# Patient Record
Sex: FEMALE | Race: WHITE | HISPANIC OR LATINO | Employment: FULL TIME | URBAN - METROPOLITAN AREA
[De-identification: names, ages, dates, MRNs, and addresses within clinical notes are randomized per-mention and may not be internally consistent; named-entity substitution may affect disease eponyms.]

---

## 2017-03-09 ENCOUNTER — HOSPITAL ENCOUNTER (EMERGENCY)
Facility: HOSPITAL | Age: 22
Discharge: HOME | End: 2017-03-09
Attending: EMERGENCY MEDICINE

## 2017-03-09 VITALS
HEIGHT: 67 IN | TEMPERATURE: 99 F | BODY MASS INDEX: 23.54 KG/M2 | OXYGEN SATURATION: 99 % | DIASTOLIC BLOOD PRESSURE: 66 MMHG | WEIGHT: 150 LBS | RESPIRATION RATE: 18 BRPM | HEART RATE: 63 BPM | SYSTOLIC BLOOD PRESSURE: 120 MMHG

## 2017-03-09 DIAGNOSIS — N39.0 URINARY TRACT INFECTION WITHOUT HEMATURIA, SITE UNSPECIFIED: Primary | ICD-10-CM

## 2017-03-09 LAB
B-HCG UR QL: NEGATIVE
BILIRUB UR QL STRIP: NEGATIVE
CLARITY UR: CLEAR
COLOR UR: YELLOW
CTP QC/QA: YES
GLUCOSE UR QL STRIP: NEGATIVE
HGB UR QL STRIP: NEGATIVE
KETONES UR QL STRIP: NEGATIVE
LEUKOCYTE ESTERASE UR QL STRIP: ABNORMAL
MICROSCOPIC COMMENT: NORMAL
NITRITE UR QL STRIP: NEGATIVE
PH UR STRIP: 7 [PH] (ref 5–8)
PROT UR QL STRIP: NEGATIVE
SP GR UR STRIP: 1.01 (ref 1–1.03)
URN SPEC COLLECT METH UR: ABNORMAL
UROBILINOGEN UR STRIP-ACNC: NEGATIVE EU/DL
WBC #/AREA URNS HPF: 5 /HPF (ref 0–5)

## 2017-03-09 PROCEDURE — 99283 EMERGENCY DEPT VISIT LOW MDM: CPT | Mod: 25

## 2017-03-09 PROCEDURE — 81000 URINALYSIS NONAUTO W/SCOPE: CPT

## 2017-03-09 PROCEDURE — 25000003 PHARM REV CODE 250: Performed by: NURSE PRACTITIONER

## 2017-03-09 PROCEDURE — 87086 URINE CULTURE/COLONY COUNT: CPT

## 2017-03-09 PROCEDURE — 81025 URINE PREGNANCY TEST: CPT | Performed by: NURSE PRACTITIONER

## 2017-03-09 RX ORDER — PHENAZOPYRIDINE HYDROCHLORIDE 200 MG/1
200 TABLET, FILM COATED ORAL 3 TIMES DAILY
Qty: 6 TABLET | Refills: 0 | Status: SHIPPED | OUTPATIENT
Start: 2017-03-09 | End: 2017-03-19

## 2017-03-09 RX ORDER — NITROFURANTOIN 25; 75 MG/1; MG/1
100 CAPSULE ORAL 2 TIMES DAILY
Qty: 10 CAPSULE | Refills: 0 | Status: SHIPPED | OUTPATIENT
Start: 2017-03-09 | End: 2017-03-16

## 2017-03-09 RX ORDER — PHENAZOPYRIDINE HYDROCHLORIDE 100 MG/1
200 TABLET, FILM COATED ORAL
Status: COMPLETED | OUTPATIENT
Start: 2017-03-09 | End: 2017-03-09

## 2017-03-09 RX ADMIN — PHENAZOPYRIDINE HYDROCHLORIDE 200 MG: 100 TABLET ORAL at 04:03

## 2017-03-09 NOTE — DISCHARGE INSTRUCTIONS
Infección De La Vejiga,Flora [Bladder Infection: Female, Adult]    Jo infección de la vejiga (cistitis [cystitis - UTI]) suele provocar constantes deseos de orinar y ardor al orinar. Es posible que la orina se steff turbia u oscura, o que tenga olor speedy. Puede elham dolor en la parte baja del abdomen. Jo infección de la vejiga se produce cuando las bacterias del área vaginal ingresan al orificio donde desemboca la vejiga (la uretra [urethra]). Puede ocurrir después de elham tenido relaciones sexuales, por usar ropas muy ajustadas, por deshidratación y otros factores.  Cuidados En La [place]  · Oxana abundante líquido (al menos, entre 6 y 8 vasos por día, excepto que le hayan indicado limitar los líquidos por otras razones médicas). Eso hará que el medicamento ingrese mejor al sistema urinario y arrastrará las bacterias fuera de virk cuerpo.  · Evite tener relaciones sexuales hasta que los síntomas hayan desaparecido.  · No consuma cafeína, alcohol ni comidas muy condimentadas, ya que pueden irritar la vejiga.  · Jo infección de la vejiga (bladder infection) se trata con antibióticos (antibiotics). También es posible que le receten Pyridum (nombre genérico: fenazopiridina [phenazopyridine]) para aliviar el ardor. Malu medicamento hará que virk orina sea de color naranja brillante. Es posible que roshan orina de color naranja le manche la ropa. Puede usar un protector diario o jo toalla femenina para proteger la ropa.  Evite Futuras Infecciones  · Después de evacuar el intestino, siempre límpiese con un movimiento de adelante hacia atrás.  · Mantenga la serena genital limpia y seca.  · Oxana mucho líquidos todos los días para evitar la deshidratación (dehydration).  · Ambos integrantes de la keon deben lavarse antes del acto sexual.  · Orine juan después del acto sexual para limpiar la vejiga.  · Use ropa interior de algodón y pantimedias con recubrimiento de algodón. Evite usar pantalones muy ajustados.  · Si está  tomando píldoras anticonceptivas y tiene frecuentes infecciones de vejiga, háblelo con virk médico.  Visita De Control  Visite a virk médico si no mcleod desparecido todos los síntomas después de jaimee días de tratamiento.  Busque Prontamente Atención Médica  si algo de lo siguiente ocurre:  · Fiebre de 100.4°F (38°C) o más dee, o maddy le haya indicado virk proveedor de atención médica.  · No hay mejoría después de jaimee días de tratamiento.  · Mayor dolor en la espalda o el abdomen.  · Vómito persistente (no puede mantener el medicamento en el estómago).  · Debilidad, mareo o desmayo.  · Secreción vaginal.  · Dolor, enrojecimiento o hinchazón en los labios vaginales (serena exterior de la vagina).  Date Last Reviewed: 4/8/2014  © 2335-8589 [employer]. [address] [place] [address], [place] [address]. Todos los derechos reservados. Esta información no pretende sustituir la atención médica profesional. Sólo virk médico puede diagnosticar y tratar un problema de kailee.

## 2017-03-09 NOTE — ED AVS SNAPSHOT
[facility]  [address] [facility] [address]               Katherine SHELBY Andrews   3/9/2017  3:43 PM   ED    Descripción:  Female : 1995   Departamento:  [facility]           Tsai Cuidado fue coordinado por:     Provider Role From To    Marlee Woodruff MD Attending Provider 17 [id number] --    ANNEMARIE Garg Nurse Practitioner 17 [id number] --      Razón de la khushbu     Dysuria           Diagnósticos de Esta Visita        Comentarios    Urinary tract infection without hematuria, site unspecified    -  Primario       ED Disposition     Ninguna           Lista de tareas           Información de seguimiento     Realice un seguimiento con:  [facility]    Cómo:  Amanuel jo khushbu lo antes posible    Cuándo:  3/12/2017    Especialidad:  Family Medicine    Información de contacto:    [address]  [facility] [address] [phone]        Seguimiento:       Cómo:  Amanuel jo khushbu lo antes posible    Cuándo:  3/12/2017      Recetas para recoger        Disp Refills Start End    nitrofurantoin, macrocrystal-monohydrate, (MACROBID) 100 MG capsule 10 capsule 0 3/9/2017 3/16/2017    Take 1 capsule (100 mg total) by mouth 2 (two) times daily. - Oral    phenazopyridine (PYRIDIUM) 200 MG tablet 6 tablet 0 3/9/2017 3/19/2017    Take 1 tablet (200 mg total) by mouth 3 (three) times daily. PRN pain. - Oral      [facility]     [facility] Enfermeras - Asistencia   Enfermeras registradas de [facility] pueden ayudarle a reservar jo khushbu, proveer educación para la kailee, asesoría clínica, y otros servicios de asesoramiento.   Llame para gustavo servicio gratuito a [phone].             Medicamentos           Mensaje sobre Medicamentos     Verificar los cambios y / o adiciones a tsai régimen de medicación son los mismos que discutir con tsai médico. Si cualquiera de estos cambios o adiciones son  "incorrectos, por favor notifique a virk proveedor de atención médica.        EMPEZAR a bravo estos medicamentos NUEVOS        Refills    nitrofurantoin, macrocrystal-monohydrate, (MACROBID) 100 MG capsule 0    Sig: Take 1 capsule (100 mg total) by mouth 2 (two) times daily.    Categoría: Print    Vía: Oral    phenazopyridine (PYRIDIUM) 200 MG tablet 0    Sig: Take 1 tablet (200 mg total) by mouth 3 (three) times daily. PRN pain.    Categoría: Print    Vía: Oral      These medications were administered today        Dose Freq    phenazopyridine tablet 200 mg 200 mg ED 1 Time    Sig: Take 2 tablets (200 mg total) by mouth ED 1 Time.    Categoría: Normal    Vía: Oral           Verifique que la siguiente lista de medicamentos es jo representación exacta de los medicamentos que está tomando actualmente. Si no hay ningunos reportados, la lista puede estar en govea. Si no es correcta, por favor póngase en contacto con virk proveedor de atención médica. Lleve esta lista con usted en nasir de emergencia.           Medicamentos Actuales     nitrofurantoin, macrocrystal-monohydrate, (MACROBID) 100 MG capsule Take 1 capsule (100 mg total) by mouth 2 (two) times daily.    phenazopyridine (PYRIDIUM) 200 MG tablet Take 1 tablet (200 mg total) by mouth 3 (three) times daily. PRN pain.           Información de referencia clínica           Mariela signos vitales freedom     PS Pulso Temperatura Resp [place]    129/75 (BP Location: Right arm, Patient Position: Sitting) 63 98.2 °F (36.8 °C) (Oral) 14 5' 7" (1.702 m) 68 kg (150 lb)    Ultima menstruación SpO2 BMI ([facility])             02/16/2017 100% 23.49 kg/m2         Alergias     A partir del:  3/9/2017        No Known Allergies      Vacunas     Administradas en la fecha de la visita:  3/9/2017        None      ED Micro, Lab, POCT     Start Ordered       Status Ordering Provider    03/09/17 1642 03/09/17 1642  Urine culture  Add-on      Completed     03/09/17 1550 03/09/17 1549  Urinalysis  " STAT      Final result     03/09/17 1550 03/09/17 1549  POCT urine pregnancy  Once      Final result     03/09/17 1549 03/09/17 1549  Urinalysis Microscopic  Once      Final result       ED Imaging Orders     None        Instrucciones a andrez de dee         Infección De La Vejiga,Flora [Bladder Infection: Female, Adult]    Jo infección de la vejiga (cistitis [cystitis - UTI]) suele provocar constantes deseos de orinar y ardor al orinar. Es posible que la orina se steff turbia u oscura, o que tenga olor spedey. Puede elham dolor en la parte baja del abdomen. Jo infección de la vejiga se produce cuando las bacterias del área vaginal ingresan al orificio donde desemboca la vejiga (la uretra [urethra]). Puede ocurrir después de elham tenido relaciones sexuales, por usar ropas muy ajustadas, por deshidratación y otros factores.  Cuidados En [place]  · Oxana abundante líquido (al menos, entre 6 y 8 vasos por día, excepto que le hayan indicado limitar los líquidos por otras razones médicas). Eso hará que el medicamento ingrese mejor al sistema urinario y arrastrará las bacterias fuera de virk cuerpo.  · Evite tener relaciones sexuales hasta que los síntomas hayan desaparecido.  · No consuma cafeína, alcohol ni comidas muy condimentadas, ya que pueden irritar la vejiga.  · Jo infección de la vejiga (bladder infection) se trata con antibióticos (antibiotics). También es posible que le receten Pyridum (nombre genérico: fenazopiridina [phenazopyridine]) para aliviar el ardor. Malu medicamento hará que virk orina sea de color naranja brillante. Es posible que roshan orina de color naranja le manche la ropa. Puede usar un protector diario o jo toalla femenina para proteger la ropa.  Evite Futuras Infecciones  · Después de evacuar el intestino, siempre límpiese con un movimiento de adelante hacia atrás.  · Mantenga la serena genital limpia y seca.  · Oxana mucho líquidos todos los días para evitar la deshidratación (dehydration).  · Ambos  integrantes de la keon deben lavarse antes del acto sexual.  · Orine juan después del acto sexual para limpiar la vejiga.  · Use ropa interior de algodón y pantimedias con recubrimiento de algodón. Evite usar pantalones muy ajustados.  · Si está tomando píldoras anticonceptivas y tiene frecuentes infecciones de vejiga, háblelo con virk médico.  Visita De Control  Visite a virk médico si no mcleod desparecido todos los síntomas después de jaimee días de tratamiento.  Busque Prontamente Atención Médica  si algo de lo siguiente ocurre:  · Fiebre de 100.4°F (38°C) o más dee, o maddy le haya indicado virk proveedor de atención médica.  · No hay mejoría después de jaimee días de tratamiento.  · Mayor dolor en la espalda o el abdomen.  · Vómito persistente (no puede mantener el medicamento en el estómago).  · Debilidad, mareo o desmayo.  · Secreción vaginal.  · Dolor, enrojecimiento o hinchazón en los labios vaginales (serena exterior de la vagina).  Date Last Reviewed: 4/8/2014  © 7034-7380 [employer]. [address] [place] [address], [place] [address]. Todos los derechos reservados. Esta información no pretende sustituir la atención médica profesional. Sólo virk médico puede diagnosticar y tratar un problema de kailee.          Referencias/Adjuntos de dee     PHENAZOPYRIDINE TABLETS (Albanian)    NITROFURANTOIN TABLETS OR CAPSULES (Albanian)      Registrarse para [facility]     La activación de virk cuenta [facility] es tan fácil maddy 1-2-3!    1) Ir a [email or web address], seleccione Registrarse Ahora, meter el código de activación y virk fecha de nacimiento, y seleccione Próximo.    [id number]  Expires: 4/23/2017  4:43 PM      2) Crear un nombre de usuario y contraseña para usar cuando se visita [facility] en el futuro y selecciona jo pregunta de seguridad en nasir de que pierda virk contraseña y seleccione Próximo.    3) Introduzca virk dirección de correo electrónico y juan [facility] en Registrarse!    Información Adicional  Si tiene alguna  pregunta, por favor, e-mail [email or web address] o llame al [phone] para hablar con nuestro personal. Recuerde, [facility] no debe ser usada para necesidades urgentes. En nasir de emergencia médica, llame al [phone].         [facility] cumple con las leyes federales aplicables de derechos civiles y no discrimina por motivos de elvin, color, origen nacional, edad, discapacidad, o sexo.        Language Assistance Services     ATTENTION: Language assistance services are available, free of charge. Please call [phone].      ATENCIÓN: Si habla anandañol, tiene a virk disposición servicios gratuitos de asistencia lingüística. Llame al [phone].     CHÚ Ý: N?u b?n nói Ti?ng Vi?t, có các d?ch v? h? tr? ngôn ng? mi?n phí dành cho b?n. G?i s? [phone].                      [facility]  [address]  [facility] [address]               Katherine Andrews   3/9/2017  3:43 PM   ED    Description:  Female : 1995   Department:  [facility]           Your Care was Coordinated By:     Provider Role From To    Marlee Woodruff MD Attending Provider 17 2697 --    ANNEMARIE Garg Nurse Practitioner 17 1541 --      Reason for Visit     Dysuria           Diagnoses this Visit        Comments    Urinary tract infection without hematuria, site unspecified    -  Primary       ED Disposition     None           To Do List           Follow-up Information     Follow up with [facility]. Schedule an appointment as soon as possible for a visit in 3 days.    Specialty:  Family Medicine    Contact information:    [address], [address]  [facility] [address] [phone]        Schedule an appointment as soon as possible for a visit in 3 days to follow up.       These Medications        Disp Refills Start End    nitrofurantoin, macrocrystal-monohydrate, (MACROBID) 100 MG capsule 10 capsule 0 3/9/2017  3/16/2017    Take 1 capsule (100 mg total) by mouth 2 (two) times daily. - Oral    phenazopyridine (PYRIDIUM) 200 MG tablet 6 tablet 0 3/9/2017 3/19/2017    Take 1 tablet (200 mg total) by mouth 3 (three) times daily. PRN pain. - Oral      [facility]     [facility] On Call Nurse Care Line - 24/7 Assistance  Registered nurses in the [facility] On Call Center provide clinical advisement, health education, appointment booking, and other advisory services.  Call for this free service at [phone].             Medications           Message regarding Medications     Verify the changes and/or additions to your medication regime listed below are the same as discussed with your clinician today.  If any of these changes or additions are incorrect, please notify your healthcare provider.        START taking these NEW medications        Refills    nitrofurantoin, macrocrystal-monohydrate, (MACROBID) 100 MG capsule 0    Sig: Take 1 capsule (100 mg total) by mouth 2 (two) times daily.    Class: Print    Route: Oral    phenazopyridine (PYRIDIUM) 200 MG tablet 0    Sig: Take 1 tablet (200 mg total) by mouth 3 (three) times daily. PRN pain.    Class: Print    Route: Oral      These medications were administered today        Dose Freq    phenazopyridine tablet 200 mg 200 mg ED 1 Time    Sig: Take 2 tablets (200 mg total) by mouth ED 1 Time.    Class: Normal    Route: Oral           Verify that the below list of medications is an accurate representation of the medications you are currently taking.  If none reported, the list may be blank. If incorrect, please contact your healthcare provider. Carry this list with you in case of emergency.           Current Medications     nitrofurantoin, macrocrystal-monohydrate, (MACROBID) 100 MG capsule Take 1 capsule (100 mg total) by mouth 2 (two) times daily.    phenazopyridine (PYRIDIUM) 200 MG tablet Take 1 tablet (200 mg total) by mouth 3 (three) times daily. PRN pain.           Clinical  "Reference Information           Your Vitals Were     BP Pulse Temp Resp Height Weight    129/75 (BP Location: Right arm, Patient Position: Sitting) 63 98.2 °F (36.8 °C) (Oral) 14 5' 7" (1.702 m) 68 kg (150 lb)    Last Period SpO2 BMI             02/16/2017 100% 23.49 kg/m2         Allergies as of 3/9/2017     No Known Allergies      Immunizations Administered on Date of Encounter - 3/9/2017     None      ED Micro, Lab, POCT     Start Ordered       Status Ordering Provider    03/09/17 1642 03/09/17 1642  Urine culture  Add-on      Completed     03/09/17 1550 03/09/17 1549  Urinalysis  STAT      Final result     03/09/17 1550 03/09/17 1549  POCT urine pregnancy  Once      Final result     03/09/17 1549 03/09/17 1549  Urinalysis Microscopic  Once      Final result       ED Imaging Orders     None        Discharge Instructions         Infección De La Vejiga,Flora [Bladder Infection: Female, Adult]    Suze infección de la vejiga (cistitis [cystitis - UTI]) suele provocar constantes deseos de orinar y ardor al orinar. Es posible que la orina se steff turbia u oscura, o que tenga olor speedy. Puede elham dolor en la parte baja del abdomen. Suze infección de la vejiga se produce cuando las bacterias del área vaginal ingresan al orificio donde desemboca la vejiga (la uretra [urethra]). Puede ocurrir después de elham tenido relaciones sexuales, por usar ropas muy ajustadas, por deshidratación y otros factores.  Cuidados En La [place]  · Oxana abundante líquido (al menos, entre 6 y 8 vasos por día, excepto que le hayan indicado limitar los líquidos por otras razones médicas). Eso hará que el medicamento ingrese mejor al sistema urinario y arrastrará las bacterias fuera de virk cuerpo.  · Evite tener relaciones sexuales hasta que los síntomas hayan desaparecido.  · No consuma cafeína, alcohol ni comidas muy condimentadas, ya que pueden irritar la vejiga.  · Suze infección de la vejiga (bladder infection) se trata con antibióticos " (antibiotics). También es posible que le receten Pyridum (nombre genérico: fenazopiridina [phenazopyridine]) para aliviar el ardor. Malu medicamento hará que virk orina sea de color naranja brillante. Es posible que roshan orina de color naranja le manche la ropa. Puede usar un protector diario o jo toalla femenina para proteger la ropa.  Evite Futuras Infecciones  · Después de evacuar el intestino, siempre límpiese con un movimiento de adelante hacia atrás.  · Mantenga la serena genital limpia y seca.  · Oxana mucho líquidos todos los días para evitar la deshidratación (dehydration).  · Ambos integrantes de la keon deben lavarse antes del acto sexual.  · Orine juan después del acto sexual para limpiar la vejiga.  · Use ropa interior de algodón y pantimedias con recubrimiento de algodón. Evite usar pantalones muy ajustados.  · Si está tomando píldoras anticonceptivas y tiene frecuentes infecciones de vejiga, háblelo con virk médico.  Visita De Control  Visite a virk médico si no mcleod desparecido todos los síntomas después de jaimee días de tratamiento.  Busque Prontamente Atención Médica  si algo de lo siguiente ocurre:  · Fiebre de 100.4°F (38°C) o más dee, o maddy le haya indicado virk proveedor de atención médica.  · No hay mejoría después de jaimee días de tratamiento.  · Mayor dolor en la espalda o el abdomen.  · Vómito persistente (no puede mantener el medicamento en el estómago).  · Debilidad, mareo o desmayo.  · Secreción vaginal.  · Dolor, enrojecimiento o hinchazón en los labios vaginales (serena exterior de la vagina).  Date Last Reviewed: 4/8/2014  © 4353-8759 [employer]. [address] [place] [address], [place] [address]. Todos los derechos reservados. Esta información no pretende sustituir la atención médica profesional. Sólo virk médico puede diagnosticar y tratar un problema de kailee.          Discharge References/Attachments     PHENAZOPYRIDINE TABLETS (Macedonian)    NITROFURANTOIN TABLETS OR CAPSULES (Macedonian)       [facility] Sign-Up     Activating your [facility] account is as easy as 1-2-3!     1) Visit [email or web address], select Sign Up Now, enter this activation code and your date of birth, then select Next.  [email or web address]  Expires: 4/23/2017  4:43 PM      2) Create a username and password to use when you visit [facility] in the future and select a security question in case you lose your password and select Next.    3) Enter your e-mail address and click Sign Up!    Additional Information  If you have questions, please e-mail [email or web address] or call [phone] to talk to our [facility] staff. Remember, [facility] is NOT to be used for urgent needs. For medical emergencies, dial 911.          [facility] complies with applicable Federal civil rights laws and does not discriminate on the basis of race, color, national origin, age, disability, or sex.        Language Assistance Services     ATTENTION: Language assistance services are available, free of charge. Please call [phone].      ATENCIÓN: Si habla español, tiene a virk disposición servicios gratuitos de asistencia lingüística. Llame al [phone].     CHÚ Ý: N?u b?n nói Ti?ng Vi?t, có các d?ch v? h? tr? ngôn ng? mi?n phí dành cho b?n. G?i s? [phone].

## 2017-03-09 NOTE — ED PROVIDER NOTES
Encounter Date: 3/9/2017       History     Chief Complaint   Patient presents with    Dysuria     accompanied by pelvic pain x3 days; denies fever, vaginal discharge     Review of patient's allergies indicates:  No Known Allergies  HPI Comments: 20yo female, previously healthy, here for dysuria and lower abd pain since yesterday.  Pt denies fever, trauma, vomiting, diarrhea, constipation, vaginal bleeding or discharge, and rash.  She has tried nothing for the symptoms.  Pt is sexually active with a new partner and she reports consistent condom use.  No history of STI.  Pain only upon urination.     Patient is a 21 y.o. female presenting with the following complaint: female genitourinary complaint. The history is provided by the patient.   Female  Problem   Primary symptoms include pelvic pain, dysuria.    Primary symptoms include no discharge, no fever, no dyspareunia, no genital lesions, no genital pain, no genital rash, no genital itching, no genital odor, no vaginal bleeding.   This is a new problem. The current episode started yesterday. The problem occurs constantly. The problem has been unchanged. The symptoms occur during urination. She is not pregnant. She has not missed her period. The patient's menstrual history has been regular. Associated symptoms include frequency. Pertinent negatives include no anorexia, no abdominal swelling, no abdominal pain, no constipation, no diarrhea, no nausea, no vomiting, no light-headedness and no dizziness. She has tried nothing for the symptoms. The treatment provided no relief. Sexual activity: sexually active. There is no concern regarding sexually transmitted diseases. She uses condoms for contraception. Associated medical issues do not include STD, gynecological surgery or ectopic pregnancy.     History reviewed. No pertinent past medical history.  History reviewed. No pertinent surgical history.  History reviewed. No pertinent family history.  Social History    Substance Use Topics    Smoking status: Never Smoker    Smokeless tobacco: Never Used    Alcohol use Yes     Review of Systems   Constitutional: Negative for chills and fever.   HENT: Negative for congestion and sore throat.    Gastrointestinal: Negative for abdominal pain, anorexia, constipation, diarrhea, nausea and vomiting.   Genitourinary: Positive for dysuria, frequency and pelvic pain. Negative for difficulty urinating, dyspareunia, flank pain, hematuria, vaginal bleeding, vaginal discharge and vaginal pain.   Musculoskeletal: Negative for back pain.   Skin: Negative for rash.   Allergic/Immunologic: Negative for immunocompromised state.   Neurological: Negative for dizziness and light-headedness.   Psychiatric/Behavioral: Negative for confusion.       Physical Exam   Initial Vitals   BP Pulse Resp Temp SpO2   03/09/17 1534 03/09/17 1534 03/09/17 1534 03/09/17 1534 03/09/17 1534   129/75 63 14 98.2 °F (36.8 °C) 100 %     Physical Exam    Nursing note and vitals reviewed.  Constitutional: Vital signs are normal. She appears well-developed and well-nourished. She is active and cooperative.  Non-toxic appearance. She does not have a sickly appearance. She does not appear ill. No distress.   HENT:   Head: Normocephalic and atraumatic.   Eyes: Conjunctivae and EOM are normal.   Neck: Normal range of motion. Neck supple.   Cardiovascular: Normal rate, regular rhythm and normal heart sounds.   Pulmonary/Chest: Effort normal and breath sounds normal.   Abdominal: Soft. Normal appearance and bowel sounds are normal. She exhibits no distension. There is tenderness in the suprapubic area. There is no rigidity, no rebound, no guarding and no CVA tenderness.   Neurological: She is alert and oriented to person, place, and time. She has normal strength. GCS eye subscore is 4. GCS verbal subscore is 5. GCS motor subscore is 6.   Skin: Skin is warm, dry and intact. No rash noted.   Psychiatric: She has a normal mood and  affect. Her speech is normal and behavior is normal. Judgment and thought content normal. Cognition and memory are normal.         ED Course   Procedures  Labs Reviewed   URINALYSIS - Abnormal; Notable for the following:        Result Value    Leukocytes, UA 2+ (*)     All other components within normal limits   CULTURE, URINE   CULTURE, URINE   URINALYSIS MICROSCOPIC   POCT URINE PREGNANCY             Medical Decision Making:   Initial Assessment:   20yo female with dysuria since yesterday. Sexually active, reports consistent condom use.  Denies fever, vomiting, vaginal bleeding, discharge, and rash.  Pt appears well, nontoxic.  Vitals stable.  Abd soft, suprapubic tenderness, No r/r/g.  No distention or CVA tenderness.    Differential Diagnosis:   UTI, pregnancy, dysuria  Clinical Tests:   Lab Tests: Ordered and Reviewed  ED Management:  UA, UPT, urine culture  UA shows infection.  UPT negative.  Advised increased water intake and f/u with PCP or [facility] within 3 days.  Culture sent.  Advised return if condition changes, progresses, or if there are concerns.  RX Macrobid and Pyridium.  Pt verb understanding and compliance.               Attending Attestation:     Physician Attestation Statement for NP/PA:   I discussed this assessment and plan of this patient with the NP/PA, but I did not personally examine the patient. The face to face encounter was performed by the NP/PA.    Other NP/PA Attestation Additions:    History of Present Illness: Dysuria and pelvic pain x 3days    Medical Decision Making: UA shows 2+ leukocytes - will treat with abx and send UCx.  No sigsn of pyelonephritis or kidney stone.                 ED Course     Clinical Impression:   The encounter diagnosis was Urinary tract infection without hematuria, site unspecified.          ANNEMARIE Garg  03/09/17 4560       Marlee Woodruff MD  04/02/17 [id number]

## 2017-03-11 LAB — BACTERIA UR CULT: NORMAL

## 2018-01-23 ENCOUNTER — HOSPITAL ENCOUNTER (EMERGENCY)
Facility: HOSPITAL | Age: 23
Discharge: HOME | End: 2018-01-23
Attending: EMERGENCY MEDICINE

## 2018-01-23 VITALS
OXYGEN SATURATION: 100 % | HEART RATE: 53 BPM | HEIGHT: 67 IN | WEIGHT: 160 LBS | TEMPERATURE: 98 F | RESPIRATION RATE: 18 BRPM | BODY MASS INDEX: 25.11 KG/M2 | SYSTOLIC BLOOD PRESSURE: 116 MMHG | DIASTOLIC BLOOD PRESSURE: 71 MMHG

## 2018-01-23 DIAGNOSIS — S09.90XS TRAUMATIC INJURY OF HEAD, SEQUELA: Primary | ICD-10-CM

## 2018-01-23 DIAGNOSIS — T14.90XA TRAUMA: ICD-10-CM

## 2018-01-23 LAB
B-HCG UR QL: NEGATIVE
CTP QC/QA: YES

## 2018-01-23 PROCEDURE — 99284 EMERGENCY DEPT VISIT MOD MDM: CPT | Mod: 25

## 2018-01-23 PROCEDURE — 81025 URINE PREGNANCY TEST: CPT | Performed by: EMERGENCY MEDICINE

## 2018-01-23 RX ORDER — ONDANSETRON 4 MG/1
4 TABLET, ORALLY DISINTEGRATING ORAL EVERY 6 HOURS PRN
Qty: 12 TABLET | Refills: 0 | Status: SHIPPED | OUTPATIENT
Start: 2018-01-23

## 2018-01-23 RX ORDER — NAPROXEN 375 MG/1
375 TABLET ORAL 2 TIMES DAILY WITH MEALS
Qty: 20 TABLET | Refills: 0 | Status: SHIPPED | OUTPATIENT
Start: 2018-01-23

## 2018-01-23 NOTE — ED NOTES
Patient identifiers for Katherine Andrews checked and correct.    LOC: The patient is awake, alert and aware of environment with an appropriate affect, the patient is oriented x 3 and speaking appropriately.  APPEARANCE: Patient resting comfortably and in no acute distress, patient is clean and well groomed, patient's clothing are properly fastened.  SKIN: The skin is warm and dry, patient has age appropriate skin turgor and moist mucus membranes, skin intact, no breakdown or bruising noted.  MUSCULOSKELETAL: Patient moving all extremities well, no obvious swelling or deformities noted.  RESPIRATORY: Airway is open and patent, respirations are spontaneous, patient has a normal effort and rate, no accessory muscle use noted.  Clear breath sounds bilaterally.  CARDIAC: Patient has a normal rate and rhythm, no periphreal edema noted, capillary refill < 3 seconds.  ABDOMEN: Soft and non tender to palpation, no distention noted.  Normoactive bowel sounds x4.  NEUROLOGIC: PERRL, facial expression is symmetrical, bilateral hand grasp equal and even, normal sensation in all extremities when touched with a finger.

## 2018-01-23 NOTE — ED PROVIDER NOTES
Encounter Date: 1/23/2018    SCRIBE #1 NOTE: I, Salvador Hunter, am scribing for, and in the presence of,  Qi Samuel MD. I have scribed the entire note.       History     Chief Complaint   Patient presents with    Fall     Patient slipped on ice going down steps last wednesday and hit right side of head and neck area.  Patient complaints of pain to area.  Denies LOC.     Katherine Andrews is a 22 y.o. female who presents to the ED with  A compliant of headache after a fall 6 days ago. She reports a slip and fall on ice striking her head on the ground. Since that time she has had intermittent headaches that have made sleep difficult.  She also report right neck pain and posterior occipital pain. The patient denies nausea/vomtiing, photophobia, unilateral weakness or any other symptoms.            Review of patient's allergies indicates:  No Known Allergies  History reviewed. No pertinent past medical history.  History reviewed. No pertinent surgical history.  History reviewed. No pertinent family history.  Social History   Substance Use Topics    Smoking status: Never Smoker    Smokeless tobacco: Never Used    Alcohol use Yes     Review of Systems   Constitutional: Negative for fever.   HENT: Negative for sore throat.    Respiratory: Negative for shortness of breath.    Cardiovascular: Negative for chest pain.   Gastrointestinal: Negative for nausea.   Genitourinary: Negative for dysuria.   Musculoskeletal: Positive for neck pain and neck stiffness. Negative for back pain.   Skin: Negative for rash.   Neurological: Positive for headaches. Negative for weakness.   Hematological: Does not bruise/bleed easily.       Physical Exam     Initial Vitals [01/23/18 0922]   BP Pulse Resp Temp SpO2   120/83 71 15 98.2 °F (36.8 °C) 100 %      MAP       95.33         Physical Exam    Nursing note and vitals reviewed.  Constitutional: She appears well-developed.   HENT:   Head: Normocephalic.   Mouth/Throat: Oropharynx is clear  and moist.   No hemotympanum, mild tenderness to the right parietal scalp, no hematoma, no step offs,    Eyes: Conjunctivae are normal.   Neck: Neck supple.   neck with tenderness to the right paraspinous and sternoclidomastoid muscles, no nswelling, good ROm, no posterior process tenderness,        Cardiovascular: Normal rate, regular rhythm, normal heart sounds and intact distal pulses. Exam reveals no gallop and no friction rub.    No murmur heard.  Pulmonary/Chest: Breath sounds normal. She has no wheezes. She has no rhonchi. She has no rales.   Abdominal: Soft. She exhibits no distension. There is no tenderness.   Musculoskeletal: Normal range of motion.   Neurological: She is alert and oriented to person, place, and time.   Skin: No rash noted. No erythema.   Psychiatric: She has a normal mood and affect.         ED Course   Procedures  Labs Reviewed   POCT URINE PREGNANCY        Imaging Results          CT Cervical Spine Without Contrast (Final result)  Result time 01/23/18 12:01:19    Final result by Harvinder Alfred MD (01/23/18 12:01:19)                 Impression:         No evidence of acute intracranial pathology.     No fracture or traumatic malalignment in the cervical spine.    Bilateral cervical ribs.      Electronically signed by: HARVINDER ALFRED MD  Date:     01/23/18  Time:    12:01              Narrative:    CT head and cervical spine without contrast    01/23/18 11:20:25    Accession# [id number]    CLINICAL INDICATION: 22 year old F with   injury    TECHNIQUE: Axial CT images obtained throughout the region of the head and cervical spine without the use of intravenous contrast. Axial, sagittal and coronal reconstructions were performed.    COMPARISON: No priors.    FINDINGS:    Head:     The ventricular system is normal in size without evidence of hydrocephalus.     The brain parenchyma demonstrates no evidence of mass lesion, hemorrhage, or recent or remote major vascular distribution  infarct.     No extra-axial blood, mass, or fluid collections.     The cranium appears intact.     Spine:     The vertebral bodies maintain normal height and morphology without evidence of fracture or osseous destructive process. Normal alignment is preserved.    There are mild degenerative changes without evidence of bony spinal canal stenosis or high grade neuroforaminal narrowing.     Limited evaluation of the intraspinal contents demonstrates no evidence of mass or epidural hematoma.     The regional soft tissue structures demonstrate no acute abnormalities. Bilateral cervical ribs.                             CT Head Without Contrast (Final result)  Result time 01/23/18 12:01:18    Final result by Harvinder Alfred MD (01/23/18 12:01:18)                 Impression:         No evidence of acute intracranial pathology.     No fracture or traumatic malalignment in the cervical spine.    Bilateral cervical ribs.      Electronically signed by: HARVINDER ALFRED MD  Date:     01/23/18  Time:    12:01              Narrative:    CT head and cervical spine without contrast    01/23/18 11:20:25    Accession# [id number]    CLINICAL INDICATION: 22 year old F with   injury    TECHNIQUE: Axial CT images obtained throughout the region of the head and cervical spine without the use of intravenous contrast. Axial, sagittal and coronal reconstructions were performed.    COMPARISON: No priors.    FINDINGS:    Head:     The ventricular system is normal in size without evidence of hydrocephalus.     The brain parenchyma demonstrates no evidence of mass lesion, hemorrhage, or recent or remote major vascular distribution infarct.     No extra-axial blood, mass, or fluid collections.     The cranium appears intact.     Spine:     The vertebral bodies maintain normal height and morphology without evidence of fracture or osseous destructive process. Normal alignment is preserved.    There are mild degenerative changes without  evidence of bony spinal canal stenosis or high grade neuroforaminal narrowing.     Limited evaluation of the intraspinal contents demonstrates no evidence of mass or epidural hematoma.     The regional soft tissue structures demonstrate no acute abnormalities. Bilateral cervical ribs.                              X-Rays:   Independently Interpreted Readings:   Head CT: No hemorrhage.  No skull fracture.  No acute stroke.   Other Readings:  Ct neck W/O contrast    Impression: no sign of fracture/dislocation    Medical Decision Making:   Clinical Tests:   Radiological Study: Ordered and Reviewed  ED Management:  Patient is here with minor head injury.  She's had some nausea I will write her some Zofran and Naprosyn for the pain.  The patient's CT head and neck are negative and she will be discharged with head injury precautions                   ED Course      Clinical Impression:     1. Traumatic injury of head, sequela    2. Trauma                                 Qi Samuel MD  01/23/18 2757

## 2021-04-14 ENCOUNTER — HOSPITAL ENCOUNTER (EMERGENCY)
Facility: HOSPITAL | Age: 26
Discharge: HOME | End: 2021-04-14
Attending: EMERGENCY MEDICINE
Payer: OTHER GOVERNMENT

## 2021-04-14 VITALS
HEART RATE: 78 BPM | BODY MASS INDEX: 27.32 KG/M2 | SYSTOLIC BLOOD PRESSURE: 124 MMHG | OXYGEN SATURATION: 98 % | WEIGHT: 170 LBS | DIASTOLIC BLOOD PRESSURE: 58 MMHG | TEMPERATURE: 99 F | RESPIRATION RATE: 18 BRPM | HEIGHT: 66 IN

## 2021-04-14 DIAGNOSIS — J06.9 VIRAL URI WITH COUGH: Primary | ICD-10-CM

## 2021-04-14 LAB
B-HCG UR QL: NEGATIVE
CTP QC/QA: YES
CTP QC/QA: YES
GROUP A STREP, MOLECULAR: NEGATIVE
INFLUENZA A, MOLECULAR: NEGATIVE
INFLUENZA B, MOLECULAR: NEGATIVE
SARS-COV-2 RDRP RESP QL NAA+PROBE: NEGATIVE
SPECIMEN SOURCE: NORMAL

## 2021-04-14 PROCEDURE — 25000003 PHARM REV CODE 250: Performed by: PHYSICIAN ASSISTANT

## 2021-04-14 PROCEDURE — 81025 URINE PREGNANCY TEST: CPT | Performed by: PHYSICIAN ASSISTANT

## 2021-04-14 PROCEDURE — 99284 EMERGENCY DEPT VISIT MOD MDM: CPT | Mod: 25

## 2021-04-14 PROCEDURE — 87502 INFLUENZA DNA AMP PROBE: CPT | Performed by: PHYSICIAN ASSISTANT

## 2021-04-14 PROCEDURE — U0002 COVID-19 LAB TEST NON-CDC: HCPCS | Performed by: PHYSICIAN ASSISTANT

## 2021-04-14 PROCEDURE — 87651 STREP A DNA AMP PROBE: CPT | Performed by: PHYSICIAN ASSISTANT

## 2021-04-14 RX ORDER — BENZONATATE 100 MG/1
100 CAPSULE ORAL 3 TIMES DAILY PRN
Qty: 20 CAPSULE | Refills: 0 | Status: SHIPPED | OUTPATIENT
Start: 2021-04-14 | End: 2021-04-24

## 2021-04-14 RX ORDER — FLUTICASONE PROPIONATE 50 MCG
1 SPRAY, SUSPENSION (ML) NASAL 2 TIMES DAILY PRN
Qty: 15 G | Refills: 0 | Status: SHIPPED | OUTPATIENT
Start: 2021-04-14

## 2021-04-14 RX ORDER — ACETAMINOPHEN 500 MG
1000 TABLET ORAL
Status: COMPLETED | OUTPATIENT
Start: 2021-04-14 | End: 2021-04-14

## 2021-04-14 RX ADMIN — ACETAMINOPHEN 1000 MG: 500 TABLET ORAL at 09:04

## 2023-12-20 ENCOUNTER — HOSPITAL ENCOUNTER (EMERGENCY)
Facility: HOSPITAL | Age: 28
Discharge: HOME | End: 2023-12-20
Attending: EMERGENCY MEDICINE

## 2023-12-20 VITALS
OXYGEN SATURATION: 96 % | WEIGHT: 185 LBS | DIASTOLIC BLOOD PRESSURE: 63 MMHG | SYSTOLIC BLOOD PRESSURE: 137 MMHG | RESPIRATION RATE: 18 BRPM | HEART RATE: 86 BPM | HEIGHT: 66 IN | BODY MASS INDEX: 29.73 KG/M2 | TEMPERATURE: 99 F

## 2023-12-20 DIAGNOSIS — J11.1 INFLUENZA: Primary | ICD-10-CM

## 2023-12-20 LAB
INFLUENZA A, MOLECULAR: NEGATIVE
INFLUENZA B, MOLECULAR: POSITIVE
SARS-COV-2 RDRP RESP QL NAA+PROBE: NEGATIVE
SPECIMEN SOURCE: ABNORMAL

## 2023-12-20 PROCEDURE — 99284 EMERGENCY DEPT VISIT MOD MDM: CPT

## 2023-12-20 PROCEDURE — 87502 INFLUENZA DNA AMP PROBE: CPT

## 2023-12-20 PROCEDURE — 96372 THER/PROPH/DIAG INJ SC/IM: CPT

## 2023-12-20 PROCEDURE — 63600175 PHARM REV CODE 636 W HCPCS

## 2023-12-20 PROCEDURE — U0002 COVID-19 LAB TEST NON-CDC: HCPCS

## 2023-12-20 RX ORDER — KETOROLAC TROMETHAMINE 30 MG/ML
30 INJECTION, SOLUTION INTRAMUSCULAR; INTRAVENOUS
Status: COMPLETED | OUTPATIENT
Start: 2023-12-20 | End: 2023-12-20

## 2023-12-20 RX ADMIN — KETOROLAC TROMETHAMINE 30 MG: 30 INJECTION, SOLUTION INTRAMUSCULAR at 01:12

## 2023-12-20 NOTE — DISCHARGE INSTRUCTIONS
Your visit in the emergency room today determined that you have a viral illness. This may take around 7 days to pass, but can be managed with over the counter medications. Please see some of my recommendations below:     If not allergic, please take over the counter Tylenol (Acetaminophen) and/or Motrin (Ibuprofen) as directed for control of pain (body aches) and/or fever. You can stagger the dosing so you are taking one or the other every three hours while spacing out the Tylenol and every 6 hours and the Motrin every 6 hours.    You may take an over the counter antihistamine medication (Allegra/Claritin/Zyrtec) as directed for nasal congestion/runny nose. You may also try a decongestant such as Mucinex D or Sudafed (found behind the pharmacy counter). Flonase is also an option that you may use- one spray each nostril twice daily OR two sprays each nostril once daily.    If you have a cough with mucus production, try an Mucinex or Robitussin. If you have a dry cough, Delsym may work better.      Sore throat recommendations: Warm fluids, warm salt water gargles, throat lozenges, tea, honey, soup, rest, hydration.     Please return or see your primary care doctor if you develop new or worsening symptoms.     Thank you for allowing me and my emergency team to take care of you here today! I hope you feel better soon. Please do not hesitate to return with any additional concerns that may arise from this or any new problem you encounter.    Our goal in the emergency department is to always give you outstanding care and exceptional service. If you receive a survey by mail or e-mail in the next week regarding your experience in our ED, we would greatly appreciate you completing it. Your feedback provides us with a way to recognize our staff who give very good care and it helps us learn how to improve when your experience was below the excellence we aspire to be!    Brook [place], PA-C [facility], [place], and   [facility]   Emergency Room Physician Assistant

## 2023-12-20 NOTE — ED TRIAGE NOTES
Pt presents to ED today c/o headache, congestion, cough, and subjective fever x 2 days reports sx unrelieved by OTC medications

## 2023-12-20 NOTE — Clinical Note
"Katherine "Yaneli Andrews was seen and treated in our emergency department on 12/20/2023.  She may return to work on 12/23/2023.       If you have any questions or concerns, please don't hesitate to call.      Monica Hernandez PA-C"

## 2023-12-20 NOTE — ED PROVIDER NOTES
Encounter Date: 12/20/2023       History     Chief Complaint   Patient presents with    Headache     Headache and joint pain for several days. Fever started yesterday. Denies cough, N/V and diarrhea. Recent sick contact with individual with the flu.      Patient is a 28-year-old female with no significant past medical history who presents to emergency room for 3 days headache, joint pain, fever, and generalized weakness.  Fever onset yesterday and highest was up to 103° F. Patient denies chest pain, shortness of breath, sore throat, congestion, nausea, vomiting, cough, changes in appetite, weakness, numbness, or tingling.  Of note, she was around family member who tested positive for the flu last week.  Prior treatment includes various over-the-counter medications including Tamiflu.    Offered , but patient deferred.    The history is provided by the patient. No  was used.     Review of patient's allergies indicates:  No Known Allergies  No past medical history on file.  No past surgical history on file.  No family history on file.  Social History     Tobacco Use    Smoking status: Never    Smokeless tobacco: Never   Substance Use Topics    Alcohol use: Yes    Drug use: No     Review of Systems   Constitutional:  Positive for fever. Negative for chills, diaphoresis and fatigue.   HENT:  Negative for congestion, sore throat and trouble swallowing.    Respiratory:  Negative for cough and shortness of breath.    Cardiovascular:  Negative for chest pain and palpitations.   Gastrointestinal:  Negative for abdominal pain, blood in stool, constipation, diarrhea, nausea and vomiting.   Genitourinary:  Negative for difficulty urinating, dysuria, frequency and urgency.   Musculoskeletal:  Positive for arthralgias. Negative for back pain and myalgias.   Skin:  Negative for rash and wound.   Neurological:  Positive for weakness (generalized) and headaches (generalized). Negative for  light-headedness and numbness.       Physical Exam     Initial Vitals [12/20/23 1305]   BP Pulse Resp Temp SpO2   137/63 86 18 98.9 °F (37.2 °C) 96 %      MAP       --         Physical Exam    Nursing note and vitals reviewed.  Constitutional: She appears well-developed and well-nourished. She is not diaphoretic. No distress.   Patient is sitting in chair, well-appearing.  Awake and alert.   HENT:   Head: Normocephalic and atraumatic.   Right Ear: External ear normal.   Left Ear: External ear normal.   Eyes: Conjunctivae and EOM are normal. Pupils are equal, round, and reactive to light.   Neck: Neck supple.   Normal range of motion.  Cardiovascular:  Normal rate, regular rhythm and normal heart sounds.     Exam reveals no friction rub.       No murmur heard.  Pulmonary/Chest: Breath sounds normal. No respiratory distress. She has no wheezes. She has no rhonchi. She has no rales.   Abdominal: Abdomen is soft. Bowel sounds are normal. She exhibits no distension. There is no abdominal tenderness. There is no rebound and no guarding.   Musculoskeletal:         General: No tenderness or edema. Normal range of motion.      Cervical back: Normal range of motion and neck supple.     Neurological: She is alert and oriented to person, place, and time. She has normal strength. No cranial nerve deficit.   Skin: Skin is warm and dry.   Psychiatric: She has a normal mood and affect. Her behavior is normal. Thought content normal.         ED Course   Procedures  Labs Reviewed   INFLUENZA A & B BY MOLECULAR - Abnormal; Notable for the following components:       Result Value    Influenza B, Molecular Positive (*)     All other components within normal limits   SARS-COV-2 RNA AMPLIFICATION, QUAL          Imaging Results    None          Medications   ketorolac injection 30 mg (30 mg Intramuscular Given 12/20/23 1343)     Medical Decision Making  Patient presents for multiple upper respiratory symptoms.  Vital signs stable and  within normal limits.  Patient afebrile.  Physical exam as stated above.    Differential Diagnosis includes, but is not limited to bacterial sinusitis, allergic rhinitis, peritonsillar abscess, bacterial/viral pharyngitis, bronchitis, influenza, viral syndrome such as COVID.  Do not suspect bacterial sinusitis, as patient without congestion or sinus pressure/tenderness.  Physical exam with uvula midline. No abscess, erythema, or edema present. Patient without cough for >5 days; therefore, unlikely bronchitis. COVID test negative. Influenza test positive. Clinical presentation aligns with positive influenza test. Patient given Toradol for headache with relief. Advised patient on OTC symptomatic management. Patient not in window for Tamiflu at this time.     I see no indication of an emergent process beyond that addressed during our encounter. Patient stable for discharge at this time. I have counseled the patient regarding follow up with PCP and gave strict return precautions. I have discussed the final diagnosis and gave instructions regarding OTC medications. Patient verbalized understanding and is agreeable.     Amount and/or Complexity of Data Reviewed  Labs:  Decision-making details documented in ED Course.    Risk  Prescription drug management.               ED Course as of 12/20/23 5077   Wed Dec 20, 2023   1304 Patient on her 3rd day of chills, fever, headache, and generalized weakness.  [BJ]   1441 COVID-19 Rapid Screening  COVID negative. [BJ]   1441 Influenza A & B by Molecular(!)  Influenza positive. [BJ]      ED Course User Index  [BJ] Monica Hernandez PA-C                           Clinical Impression:  Final diagnoses:  [J11.1] Influenza (Primary)          ED Disposition Condition    Discharge Stable          ED Prescriptions    None       Follow-up Information       Follow up With Specialties Details Why Contact Info    Your doctor                 Monica Hernandez PA-C  12/20/23 3668